# Patient Record
Sex: MALE | Race: WHITE
[De-identification: names, ages, dates, MRNs, and addresses within clinical notes are randomized per-mention and may not be internally consistent; named-entity substitution may affect disease eponyms.]

---

## 2018-04-15 ENCOUNTER — HOSPITAL ENCOUNTER (EMERGENCY)
Dept: HOSPITAL 17 - NEPD | Age: 48
Discharge: HOME | End: 2018-04-15
Payer: OTHER GOVERNMENT

## 2018-04-15 VITALS
SYSTOLIC BLOOD PRESSURE: 159 MMHG | RESPIRATION RATE: 18 BRPM | HEART RATE: 87 BPM | TEMPERATURE: 97.9 F | DIASTOLIC BLOOD PRESSURE: 89 MMHG | OXYGEN SATURATION: 96 %

## 2018-04-15 VITALS
OXYGEN SATURATION: 98 % | DIASTOLIC BLOOD PRESSURE: 86 MMHG | RESPIRATION RATE: 18 BRPM | HEART RATE: 87 BPM | SYSTOLIC BLOOD PRESSURE: 155 MMHG

## 2018-04-15 VITALS — WEIGHT: 209.44 LBS | HEIGHT: 68 IN | BODY MASS INDEX: 31.74 KG/M2

## 2018-04-15 VITALS
TEMPERATURE: 97.2 F | DIASTOLIC BLOOD PRESSURE: 84 MMHG | RESPIRATION RATE: 20 BRPM | OXYGEN SATURATION: 98 % | SYSTOLIC BLOOD PRESSURE: 145 MMHG | HEART RATE: 77 BPM

## 2018-04-15 DIAGNOSIS — F43.24: Primary | ICD-10-CM

## 2018-04-15 DIAGNOSIS — F14.90: ICD-10-CM

## 2018-04-15 DIAGNOSIS — Z72.89: ICD-10-CM

## 2018-04-15 DIAGNOSIS — F17.200: ICD-10-CM

## 2018-04-15 DIAGNOSIS — F19.94: ICD-10-CM

## 2018-04-15 DIAGNOSIS — F12.90: ICD-10-CM

## 2018-04-15 LAB
ALBUMIN SERPL-MCNC: 4.4 GM/DL (ref 3.4–5)
ALP SERPL-CCNC: 59 U/L (ref 45–117)
ALT SERPL-CCNC: 102 U/L (ref 12–78)
APAP SERPL-MCNC: 6.8 MCG/ML (ref 10–30)
AST SERPL-CCNC: 48 U/L (ref 15–37)
BASOPHILS # BLD AUTO: 0.1 TH/MM3 (ref 0–0.2)
BASOPHILS NFR BLD: 0.6 % (ref 0–2)
BILIRUB SERPL-MCNC: 0.5 MG/DL (ref 0.2–1)
BUN SERPL-MCNC: 13 MG/DL (ref 7–18)
CALCIUM SERPL-MCNC: 8.9 MG/DL (ref 8.5–10.1)
CHLORIDE SERPL-SCNC: 103 MEQ/L (ref 98–107)
CREAT SERPL-MCNC: 1.2 MG/DL (ref 0.6–1.3)
EOSINOPHIL # BLD: 0 TH/MM3 (ref 0–0.4)
EOSINOPHIL NFR BLD: 0.3 % (ref 0–4)
ERYTHROCYTE [DISTWIDTH] IN BLOOD BY AUTOMATED COUNT: 12.8 % (ref 11.6–17.2)
GFR SERPLBLD BASED ON 1.73 SQ M-ARVRAT: 65 ML/MIN (ref 89–?)
GLUCOSE SERPL-MCNC: 120 MG/DL (ref 74–106)
HCO3 BLD-SCNC: 24.2 MEQ/L (ref 21–32)
HCT VFR BLD CALC: 48 % (ref 39–51)
HGB BLD-MCNC: 17.1 GM/DL (ref 13–17)
LYMPHOCYTES # BLD AUTO: 1.5 TH/MM3 (ref 1–4.8)
LYMPHOCYTES NFR BLD AUTO: 14.5 % (ref 9–44)
MCH RBC QN AUTO: 33.4 PG (ref 27–34)
MCHC RBC AUTO-ENTMCNC: 35.7 % (ref 32–36)
MCV RBC AUTO: 93.5 FL (ref 80–100)
MONOCYTE #: 0.6 TH/MM3 (ref 0–0.9)
MONOCYTES NFR BLD: 5.5 % (ref 0–8)
NEUTROPHILS # BLD AUTO: 8.4 TH/MM3 (ref 1.8–7.7)
NEUTROPHILS NFR BLD AUTO: 79.1 % (ref 16–70)
PLATELET # BLD: 172 TH/MM3 (ref 150–450)
PMV BLD AUTO: 9.5 FL (ref 7–11)
PROT SERPL-MCNC: 8.5 GM/DL (ref 6.4–8.2)
RBC # BLD AUTO: 5.13 MIL/MM3 (ref 4.5–5.9)
SODIUM SERPL-SCNC: 138 MEQ/L (ref 136–145)
WBC # BLD AUTO: 10.7 TH/MM3 (ref 4–11)

## 2018-04-15 PROCEDURE — 80307 DRUG TEST PRSMV CHEM ANLYZR: CPT

## 2018-04-15 PROCEDURE — 85025 COMPLETE CBC W/AUTO DIFF WBC: CPT

## 2018-04-15 PROCEDURE — 99284 EMERGENCY DEPT VISIT MOD MDM: CPT

## 2018-04-15 PROCEDURE — 80053 COMPREHEN METABOLIC PANEL: CPT

## 2018-04-15 PROCEDURE — 84443 ASSAY THYROID STIM HORMONE: CPT

## 2018-04-15 NOTE — PD
HPI


Chief Complaint:  Psychiatric Symptoms


Time Seen by Provider:  06:45


Travel History


International Travel<30 days:  No


Contact w/Intl Traveler<30days:  No


Traveled to known affect area:  No





History of Present Illness


HPI


47-year-old male presents emergency department under Baker act for psychiatric 

evaluation.  Patient denies any psychiatric or medical history.  States that he 

got into an argument with his wife.  He has Percocet prescribed to him so he 

took a handful and threatened to swallow them.  He states he did not swallow 

any of them.  He states he absolutely did make statements threatening to hurt 

himself however he did not mean any of them.  He was just upset.  States he had 

some alcoholic beverages earlier this evening.  Denies illicit drug use.  

Denies any other symptoms at this time.





PFSH


Past Medical History


Asthma:  No


Anxiety:  No


Cancer:  Yes (TESTICULAR )


Diabetes:  No


Headaches:  No


Hypertension:  No


Insomnia:  No


Migraines:  No


Seizures:  No





Past Surgical History


Joint Replacement:  Yes (BILATERAL HIP)


Other Surgery:  Yes (TESTICULAR TUMOR REMOVED)





Social History


Alcohol Use:  Yes


Tobacco Use:  Yes (1/2 PPD)


Substance Use:  No (PT DENIES)





Allergies-Medications


(Allergen,Severity, Reaction):  


Coded Allergies:  


     No Known Allergies (Unverified , 4/15/18)


Reported Meds & Prescriptions





Reported Meds & Active Scripts


Active


Reported


Percocet (Oxycodone-Acetaminophen)  mg Tab 1 Tab PO Q6H PRN








Review of Systems


Except as stated in HPI:  all other systems reviewed are Neg





Physical Exam


Narrative


GENERAL: Well-nourished, well-developed male patient in no acute distress


SKIN: Focused skin assessment warm/dry.


HEAD: Normocephalic.


EYES: No scleral icterus. No injection or drainage. 


NECK: Supple, trachea midline. No JVD or lymphadenopathy.


CARDIOVASCULAR: Regular rate and rhythm without murmurs, gallops, or rubs. 


RESPIRATORY: Breath sounds equal bilaterally. No accessory muscle use.


GASTROINTESTINAL: Abdomen soft, non-tender, nondistended. 


MUSCULOSKELETAL: No cyanosis, or edema. 


BACK: Nontender without obvious deformity. No CVA tenderness.





Data


Data


Last Documented VS





Vital Signs








  Date Time  Temp Pulse Resp B/P (MAP) Pulse Ox O2 Delivery O2 Flow Rate FiO2


 


4/15/18 05:41 97.9 87 18 159/89 (112) 96   








Orders





 Orders


Complete Blood Count With Diff (4/15/18 05:50)


Comprehensive Metabolic Panel (4/15/18 05:50)


Thyroid Stimulating Hormone (4/15/18 05:50)


Psych Screen (4/15/18 05:50)


Drug Screen, Random Urine (4/15/18 05:50)


Alcohol (Ethanol) (4/15/18 05:50)


Salicylates (Aspirin) (4/15/18 05:50)


Tylenol (Acetaminophen) (4/15/18 05:50)





Labs





Laboratory Tests








Test


  4/15/18


05:45 4/15/18


06:05


 


White Blood Count 10.7 TH/MM3  


 


Red Blood Count 5.13 MIL/MM3  


 


Hemoglobin 17.1 GM/DL  


 


Hematocrit 48.0 %  


 


Mean Corpuscular Volume 93.5 FL  


 


Mean Corpuscular Hemoglobin 33.4 PG  


 


Mean Corpuscular Hemoglobin


Concent 35.7 % 


  


 


 


Red Cell Distribution Width 12.8 %  


 


Platelet Count 172 TH/MM3  


 


Mean Platelet Volume 9.5 FL  


 


Neutrophils (%) (Auto) 79.1 %  


 


Lymphocytes (%) (Auto) 14.5 %  


 


Monocytes (%) (Auto) 5.5 %  


 


Eosinophils (%) (Auto) 0.3 %  


 


Basophils (%) (Auto) 0.6 %  


 


Neutrophils # (Auto) 8.4 TH/MM3  


 


Lymphocytes # (Auto) 1.5 TH/MM3  


 


Monocytes # (Auto) 0.6 TH/MM3  


 


Eosinophils # (Auto) 0.0 TH/MM3  


 


Basophils # (Auto) 0.1 TH/MM3  


 


CBC Comment DIFF FINAL  


 


Differential Comment   


 


Blood Urea Nitrogen 13 MG/DL  


 


Creatinine 1.20 MG/DL  


 


Random Glucose 120 MG/DL  


 


Total Protein 8.5 GM/DL  


 


Albumin 4.4 GM/DL  


 


Calcium Level 8.9 MG/DL  


 


Alkaline Phosphatase 59 U/L  


 


Aspartate Amino Transf


(AST/SGOT) 48 U/L 


  


 


 


Alanine Aminotransferase


(ALT/SGPT) 102 U/L 


  


 


 


Total Bilirubin 0.5 MG/DL  


 


Sodium Level 138 MEQ/L  


 


Potassium Level 3.5 MEQ/L  


 


Chloride Level 103 MEQ/L  


 


Carbon Dioxide Level 24.2 MEQ/L  


 


Anion Gap 11 MEQ/L  


 


Estimat Glomerular Filtration


Rate 65 ML/MIN 


  


 


 


Thyroid Stimulating Hormone


3rd Gen 2.450 uIU/ML 


  


 


 


Salicylates Level 2.3 MG/DL  


 


Acetaminophen Level 6.8 MCG/ML  


 


Ethyl Alcohol Level 36 MG/DL  


 


Urine Opiates Screen  POS 


 


Urine Barbiturates Screen  NEG 


 


Urine Amphetamines Screen  NEG 


 


Urine Benzodiazepines Screen  NEG 


 


Urine Cocaine Screen  POS 


 


Urine Cannabinoids Screen  NEG 











MDM


Medical Decision Making


Medical Screen Exam Complete:  Yes


Emergency Medical Condition:  Yes


Medical Record Reviewed:  Yes


Differential Diagnosis


Mood disorder versus personality disorder versus adjustment reaction disorder 

versus substance abuse


Narrative Course


47-year-old male presents emergency department under Baker act for psychiatric 

evaluation.  Patient appears without distress.  Vital signs are stable.  He 

reports no medical needs.  He adamantly denies taking any of his Percocet.  He 

also denies any true suicidal intent.  Lab work is ordered.





Laboratory Tests








Test


  4/15/18


05:45 4/15/18


06:05


 


White Blood Count 10.7 TH/MM3  


 


Red Blood Count 5.13 MIL/MM3  


 


Hemoglobin 17.1 GM/DL  


 


Hematocrit 48.0 %  


 


Mean Corpuscular Volume 93.5 FL  


 


Mean Corpuscular Hemoglobin 33.4 PG  


 


Mean Corpuscular Hemoglobin


Concent 35.7 % 


  


 


 


Red Cell Distribution Width 12.8 %  


 


Platelet Count 172 TH/MM3  


 


Mean Platelet Volume 9.5 FL  


 


Neutrophils (%) (Auto) 79.1 %  


 


Lymphocytes (%) (Auto) 14.5 %  


 


Monocytes (%) (Auto) 5.5 %  


 


Eosinophils (%) (Auto) 0.3 %  


 


Basophils (%) (Auto) 0.6 %  


 


Neutrophils # (Auto) 8.4 TH/MM3  


 


Lymphocytes # (Auto) 1.5 TH/MM3  


 


Monocytes # (Auto) 0.6 TH/MM3  


 


Eosinophils # (Auto) 0.0 TH/MM3  


 


Basophils # (Auto) 0.1 TH/MM3  


 


CBC Comment DIFF FINAL  


 


Differential Comment   


 


Blood Urea Nitrogen 13 MG/DL  


 


Creatinine 1.20 MG/DL  


 


Random Glucose 120 MG/DL  


 


Total Protein 8.5 GM/DL  


 


Albumin 4.4 GM/DL  


 


Calcium Level 8.9 MG/DL  


 


Alkaline Phosphatase 59 U/L  


 


Aspartate Amino Transf


(AST/SGOT) 48 U/L 


  


 


 


Alanine Aminotransferase


(ALT/SGPT) 102 U/L 


  


 


 


Total Bilirubin 0.5 MG/DL  


 


Sodium Level 138 MEQ/L  


 


Potassium Level 3.5 MEQ/L  


 


Chloride Level 103 MEQ/L  


 


Carbon Dioxide Level 24.2 MEQ/L  


 


Anion Gap 11 MEQ/L  


 


Estimat Glomerular Filtration


Rate 65 ML/MIN 


  


 


 


Thyroid Stimulating Hormone


3rd Gen 2.450 uIU/ML 


  


 


 


Salicylates Level 2.3 MG/DL  


 


Acetaminophen Level 6.8 MCG/ML  


 


Ethyl Alcohol Level 36 MG/DL  


 


Urine Opiates Screen  POS 


 


Urine Barbiturates Screen  NEG 


 


Urine Amphetamines Screen  NEG 


 


Urine Benzodiazepines Screen  NEG 


 


Urine Cocaine Screen  POS 


 


Urine Cannabinoids Screen  NEG 





Patient is medically cleared to undergo psychiatric screening for further 

evaluation and disposition.


Mental health screening discussed with the patient. Psychiatric screen ordered.





Diagnosis





 Primary Impression:  


 Adjustment reaction


 Qualified Codes:  F43.24 - Adjustment disorder with disturbance of conduct


 Additional Impression:  


 Substance abuse


Condition:  Stable











Marilyn Hernandez Apr 15, 2018 06:47

## 2018-04-15 NOTE — PD
Data


Data


Last Documented VS





Vital Signs








  Date Time  Temp Pulse Resp B/P (MAP) Pulse Ox O2 Delivery O2 Flow Rate FiO2


 


4/15/18 14:19 97.2 77 20 145/84 (104) 98 Room Air  








Orders





 Orders


Complete Blood Count With Diff (4/15/18 05:50)


Comprehensive Metabolic Panel (4/15/18 05:50)


Thyroid Stimulating Hormone (4/15/18 05:50)


Psych Screen (4/15/18 05:50)


Drug Screen, Random Urine (4/15/18 05:50)


Alcohol (Ethanol) (4/15/18 05:50)


Salicylates (Aspirin) (4/15/18 05:50)


Tylenol (Acetaminophen) (4/15/18 05:50)


Diet Regular Basic (4/15/18 Breakfast)


Diet Regular Basic (4/15/18 Lunch)


Ed Discharge Order (4/15/18 14:57)





Labs





Laboratory Tests








Test


  4/15/18


05:45 4/15/18


06:05


 


White Blood Count 10.7 TH/MM3  


 


Red Blood Count 5.13 MIL/MM3  


 


Hemoglobin 17.1 GM/DL  


 


Hematocrit 48.0 %  


 


Mean Corpuscular Volume 93.5 FL  


 


Mean Corpuscular Hemoglobin 33.4 PG  


 


Mean Corpuscular Hemoglobin


Concent 35.7 % 


  


 


 


Red Cell Distribution Width 12.8 %  


 


Platelet Count 172 TH/MM3  


 


Mean Platelet Volume 9.5 FL  


 


Neutrophils (%) (Auto) 79.1 %  


 


Lymphocytes (%) (Auto) 14.5 %  


 


Monocytes (%) (Auto) 5.5 %  


 


Eosinophils (%) (Auto) 0.3 %  


 


Basophils (%) (Auto) 0.6 %  


 


Neutrophils # (Auto) 8.4 TH/MM3  


 


Lymphocytes # (Auto) 1.5 TH/MM3  


 


Monocytes # (Auto) 0.6 TH/MM3  


 


Eosinophils # (Auto) 0.0 TH/MM3  


 


Basophils # (Auto) 0.1 TH/MM3  


 


CBC Comment DIFF FINAL  


 


Differential Comment   


 


Blood Urea Nitrogen 13 MG/DL  


 


Creatinine 1.20 MG/DL  


 


Random Glucose 120 MG/DL  


 


Total Protein 8.5 GM/DL  


 


Albumin 4.4 GM/DL  


 


Calcium Level 8.9 MG/DL  


 


Alkaline Phosphatase 59 U/L  


 


Aspartate Amino Transf


(AST/SGOT) 48 U/L 


  


 


 


Alanine Aminotransferase


(ALT/SGPT) 102 U/L 


  


 


 


Total Bilirubin 0.5 MG/DL  


 


Sodium Level 138 MEQ/L  


 


Potassium Level 3.5 MEQ/L  


 


Chloride Level 103 MEQ/L  


 


Carbon Dioxide Level 24.2 MEQ/L  


 


Anion Gap 11 MEQ/L  


 


Estimat Glomerular Filtration


Rate 65 ML/MIN 


  


 


 


Thyroid Stimulating Hormone


3rd Gen 2.450 uIU/ML 


  


 


 


Salicylates Level 2.3 MG/DL  


 


Acetaminophen Level 6.8 MCG/ML  


 


Ethyl Alcohol Level 36 MG/DL  


 


Urine Opiates Screen  POS 


 


Urine Barbiturates Screen  NEG 


 


Urine Amphetamines Screen  NEG 


 


Urine Benzodiazepines Screen  NEG 


 


Urine Cocaine Screen  POS 


 


Urine Cannabinoids Screen  NEG 











MDM


Supervised Visit with GAIL:  No


Narrative Course


Patient seen and examined by me, was informed by nursing staff that Dr. Salvador is seen the patient and lifted the Horn act.  States he spit it all 

out and was just trying to gain attention.  He has no medical complaints at 

this time.  At this time there is no indication to hold him further, no medical 

complaints and is been cleared by the psychiatrist.  Discussed return to ED 

criteria.


Diagnosis





 Primary Impression:  


 Adjustment reaction


 Qualified Codes:  F43.24 - Adjustment disorder with disturbance of conduct


 Additional Impression:  


 Substance abuse


Referrals:  


[facility] (Out patient)


as needed


Medication Management





[facility]


Patient Instructions:  General Instructions


Departure Forms:  Tests/Procedures


Disposition:  01 DISCHARGE HOME


Condition:  Stable











Jose Alberto Farrell MD Apr 15, 2018 14:56

## 2018-04-16 NOTE — PD.[email or web address].CON
Provisional Diagnosis


Admission Date





Axis I.


Substance-induced mood disorder, cocaine, cannabis and alcohol use


Axis II.


Deferred


Axis III.


No significant medical history





History of Present Illness


Service


Psychiatry


Consult Requested By


Psychiatry


Reason for Consult


Under [place] act


Primary Care Physician


Unknown


HPI


The patient 47-year-old  man, domiciled with his wife in [place], 

unemployed, , with psychiatric history of alcohol, cocaine and cannabis 

use disorder, no previous psychiatric hospitalizations, no previous suicide 

attempts, significant medical history, who presents emergency department under 

Baker act for psychiatric evaluation.   States that he got into an argument 

with his wife.  He has Percocet prescribed to him so he took a handful and 

threatened to swallow them.  He states he did not really swallow any of them "I 

just wanted to make a point, I was also drunk".  He states he absolutely did 

make statements threatening to hurt himself however he did not mean any of 

them.  He was just upset.  At the moment of this evaluation patient denies 

suicidal and was ideation, he denies visual and auditory hallucinations.





Review of Systems


Constitutional:  DENIES: Diaphoretic episodes, Fatigue, Fever, Weight gain, 

Weight loss, Chills, Dizziness, Change in appetite, Night Sweats


Endocrine:  DENIES: Heat/cold intolerance, Polydipsia, Polyuria, Polyphagia


Eyes:  DENIES: Blurred vision, Diplopia, Eye inflammation, Eye pain, Vision loss

, Photosensitivity, Double Vision


Ears, nose, mouth, throat:  DENIES: Tinnitus, Hearing loss, Vertigo, Nasal 

discharge, Oral lesions, Throat pain, Hoarseness, Ear Pain, Running Nose, 

Epistaxis, Sinus Pain, Toothache, Odynophagia


Respiratory:  DENIES: Apneas, Cough, Snoring, Wheezing, Hemoptysis, Sputum 

production, Shortness of breath


Cardiovascular:  DENIES: Chest pain, Palpitations, Syncope, Dyspnea on Exertion

, PND, Lower Extremity Edema, Orthopnea, Claudication


Gastrointestinal:  DENIES: Abdominal pain, Black stools, Bloody stools, 

Constipation, Diarrhea, Nausea, Vomiting, Difficulty Swallowing, Anorexia


Genitourinary:  DENIES: Sexual dysfunction, Urinary frequency, Urinary 

incontinence, Urgency, Hematuria, Dysuria, Nocturia, Penile Discharge, 

Testicular Pain, Testicular Swelling


Musculoskeletal:  DENIES: Joint pain, Muscle aches, Stiffness, Joint Swelling, 

Back pain, Neck pain


Integumentary:  DENIES: Abnormal pigmentation, Nail changes, Pruritus, Rash


Hematologic/lymphatic:  DENIES: Bruising, Lymphadenopathy


Immunologic/allergic:  DENIES: Eczema, Urticaria


Neurologic:  DENIES: Abnormal gait, Headache, Localized weakness, Paresthesias, 

Seizures, Speech Problems, Tremor, Poor Balance


Psychiatric:  DENIES: Anxiety, Confusion, Mood changes, Depression, 

Hallucinations, Agitation, Suicidal Ideation, Homicidal Ideation, Delusions





Past Family Social History


Coded Allergies:  


     No Known Allergies (Unverified , 4/15/18)


Reported Medications


Oxycodone-Acetaminophen (Percocet)  mg Tab, 1 TAB PO Q6H Y for PAIN, TAB 

0 Refills


   4/15/18


Family Psych History


No family psychiatric history


Social History


The patient was born and in [place], he lives in [facility] with his wife, 

unemployed, retired from the


Patient's Strengths (min. 2)


Family





Physical Exam


Vital Signs





Vital Signs








  Date Time  Temp Pulse Resp B/P (MAP) Pulse Ox O2 Delivery O2 Flow Rate FiO2


 


4/15/18 14:19 97.2 77 20 145/84 (104) 98 Room Air  











Mental Status Examination


Appearance:  Appropriate


Consciousness:  Alert


Orientation:  x4


Motor Activity:  Normal gait


Speech:  Unremarkable


Language:  Adequate


Fund of Knowledge:  Adequate


Attention and Concentration:  Adequate


Memory:  Unremarkable


Mood:  Appropriate


Affect:  Appropriate


Thought Process & Associations:  Intact


Thought Content:  Appropriate


Hallucination Type:  None


Delusion Type:  None


Suicidal Ideation:  No


Suicidal Plan:  No


Suicidal Intention:  No


Homicidal Ideation:  No


Homicidal Plan:  No


Homicidal Intention:  No


Insight:  Adequate


Judgment:  Adequate





Assessment & Plan


Problem List:  


(1) Substance induced mood disorder


ICD Codes:  F19.94 - Other psychoactive substance use, unspecified with 

psychoactive substance-induced mood disorder


Assessment & Plan:  Patient does not present any depression, anxiety, sebastien and 

psychosis.  He denies SI/HI.  Patient does not meet criteria for involuntary 

psychiatric admission at the moment.





Assessment & Plan


Estimated LOS:  Blayne Orozco MD Apr 16, 2018 10:33